# Patient Record
Sex: MALE | Race: WHITE | NOT HISPANIC OR LATINO | Employment: OTHER | URBAN - METROPOLITAN AREA
[De-identification: names, ages, dates, MRNs, and addresses within clinical notes are randomized per-mention and may not be internally consistent; named-entity substitution may affect disease eponyms.]

---

## 2023-04-24 ENCOUNTER — LAB REQUISITION (OUTPATIENT)
Dept: LAB | Facility: HOSPITAL | Age: 68
End: 2023-04-24
Payer: MEDICARE

## 2023-04-24 DIAGNOSIS — L97.512 NON-PRESSURE CHRONIC ULCER OF OTHER PART OF RIGHT FOOT WITH FAT LAYER EXPOSED: ICD-10-CM

## 2023-04-24 DIAGNOSIS — L03.031 CELLULITIS OF RIGHT TOE: ICD-10-CM

## 2023-04-24 PROCEDURE — 87205 SMEAR GRAM STAIN: CPT | Performed by: PODIATRIST

## 2023-04-24 PROCEDURE — 87075 CULTR BACTERIA EXCEPT BLOOD: CPT | Performed by: PODIATRIST

## 2023-04-24 PROCEDURE — 87077 CULTURE AEROBIC IDENTIFY: CPT | Performed by: PODIATRIST

## 2023-04-25 LAB
GRAM STN SPEC: NORMAL
GRAM STN SPEC: NORMAL

## 2023-04-28 LAB
BACTERIA SPEC ANAEROBE CULT: ABNORMAL
BACTERIA SPEC ANAEROBE CULT: ABNORMAL
BACTERIA SPEC CULT: ABNORMAL

## 2023-08-31 ENCOUNTER — LAB REQUISITION (OUTPATIENT)
Dept: LAB | Facility: HOSPITAL | Age: 68
End: 2023-08-31
Payer: MEDICARE

## 2023-08-31 DIAGNOSIS — L97.528 NON-PRESSURE CHRONIC ULCER OF OTHER PART OF LEFT FOOT WITH OTHER SPECIFIED SEVERITY: ICD-10-CM

## 2023-08-31 PROCEDURE — 87075 CULTR BACTERIA EXCEPT BLOOD: CPT | Performed by: PODIATRIST

## 2023-08-31 PROCEDURE — 87070 CULTURE OTHR SPECIMN AEROBIC: CPT | Performed by: PODIATRIST

## 2023-08-31 PROCEDURE — 87205 SMEAR GRAM STAIN: CPT | Performed by: PODIATRIST

## 2023-09-01 LAB — GRAM STN SPEC: NORMAL

## 2023-09-03 LAB
BACTERIA SPEC ANAEROBE CULT: ABNORMAL
BACTERIA WND CULT: NORMAL

## 2023-09-18 RX ORDER — GLUCOSAMINE/CHONDR SU A SOD 167-133 MG
500 CAPSULE ORAL NIGHTLY
COMMUNITY

## 2023-09-18 RX ORDER — ERGOCALCIFEROL 1.25 MG/1
50000 CAPSULE ORAL
COMMUNITY
End: 2023-09-18

## 2023-09-18 RX ORDER — METFORMIN HYDROCHLORIDE 1000 MG/1
1000 TABLET ORAL 2 TIMES DAILY WITH MEALS
COMMUNITY

## 2023-09-18 RX ORDER — MONTELUKAST SODIUM 10 MG/1
10 TABLET ORAL DAILY
COMMUNITY

## 2023-09-18 RX ORDER — INSULIN ASPART 100 [IU]/ML
16 INJECTION, SOLUTION INTRAVENOUS; SUBCUTANEOUS
COMMUNITY

## 2023-09-18 RX ORDER — DULAGLUTIDE 1.5 MG/.5ML
1.5 INJECTION, SOLUTION SUBCUTANEOUS
COMMUNITY

## 2023-09-18 RX ORDER — CHOLECALCIFEROL (VITAMIN D3) 1250 MCG
1 TABLET ORAL WEEKLY
COMMUNITY

## 2023-09-18 RX ORDER — NAPROXEN SODIUM 220 MG/1
81 TABLET, FILM COATED ORAL DAILY
COMMUNITY

## 2023-09-18 RX ORDER — ATORVASTATIN CALCIUM 40 MG/1
40 TABLET, FILM COATED ORAL DAILY
COMMUNITY

## 2023-09-18 RX ORDER — INSULIN GLARGINE 100 [IU]/ML
50 INJECTION, SOLUTION SUBCUTANEOUS NIGHTLY
COMMUNITY

## 2023-09-18 RX ORDER — MELOXICAM 15 MG/1
15 TABLET ORAL DAILY
COMMUNITY

## 2023-09-18 RX ORDER — MULTIVIT WITH IRON,MINERALS
2 TABLET,CHEWABLE ORAL DAILY
COMMUNITY

## 2023-09-18 RX ORDER — AZELASTINE 1 MG/ML
2 SPRAY, METERED NASAL 2 TIMES DAILY
COMMUNITY

## 2023-09-18 RX ORDER — METOPROLOL SUCCINATE 100 MG/1
100 TABLET, EXTENDED RELEASE ORAL DAILY
COMMUNITY

## 2023-09-18 RX ORDER — PRASUGREL 10 MG/1
10 TABLET, FILM COATED ORAL DAILY
COMMUNITY

## 2023-09-18 RX ORDER — LOSARTAN POTASSIUM AND HYDROCHLOROTHIAZIDE 25; 100 MG/1; MG/1
1 TABLET ORAL DAILY
COMMUNITY

## 2023-09-18 RX ORDER — HYDROGEN PEROXIDE 3 %
20 SOLUTION, NON-ORAL MISCELLANEOUS
COMMUNITY

## 2023-09-18 RX ORDER — AMOXICILLIN 500 MG
1 CAPSULE ORAL DAILY
COMMUNITY

## 2023-09-25 NOTE — DISCHARGE INSTRUCTIONS
FOOT SURGERY HOME CARE INSTRUCTIONS     ACTIVITY. Keep your affected foot elevated above the level of your heart over the next 48 hours. Apply an ice bag as often as possible for the next 48 hours. If you received a surgical boot, wear it until your next visit or until instructed otherwise. You may remove the surgical boot when resting and to sleep. Exercise your legs frequently by bending your knees to stimulate circulation and speed healing. You may put partial weight on your affected foot.     DIET. At home, continue with liquids. If you do not have any nausea, you may eat a soft diet. Gradually resume your regular diet. Do not drink alcohol beverages.     CARE OF INCISION. Keep your bandage clean, dry, and intact until your follow up appointment. Do not remove your bandages or inspect the wound. A small amount of blood on the bandage is normal. Limited swelling may occur and your skin may look bruised. Limited swelling and bruising are normal and expected. Do not smoke.     BATHING. Sponge bathe until your follow up appointment. You may take a shower when told by your doctor. No tub baths, swimming, or hot tubs for 2 weeks or when told by your doctor.     MEDICATION. You will receive instructions for your pain and/or antibiotic prescriptions. Take pain medication only if you need it and as directed. Take antibiotics as directed until all the pills are gone. If your medications cause stomach upset, headache, rash, or other abnormal reactions, stop taking them and call your doctor immediately.     WHEN TO CALL THE DOCTOR   Pain, burning, or numbness of the toes not relieved by elevation of the leg   Pale or cold toes, bluish nail beds   Red line going up leg   Excessive swelling   Fever over 100.4 degrees or higher and chills  Pain not relieved by the pain medication   Excessive bloody drainage or bandage become overly stained with bloody fluids   Your cast/bandage gets wet or you bump or injure the surgical site      Patient Education    Debridement Discharge Instructions      What care is needed at home?   Do not remove your dressings/bandages.   Sponge bathe only. You may take a shower when released by your doctor.  3.  Do not lift anything over 10 pounds (4.5 kg).  4. Ask your doctor when you may go back to your normal activities like work, driving, or sex.  5. Be sure to wash your hands before and after touching your wound or dressing.  6. Try not to rub or scratch the healing skin.  7. Do not smoke. It will prevent healing and can increase your risk for infection.  8. Get plenty of rest and follow a healthy diet.    What follow-up care is needed?   Be sure to keep your follow up visit.    Will physical activity be limited?   Physical activity may be limited based on the type of wound. Talk with your doctor about the right amount of activity for you.    What changes to diet are needed?   Ask your doctor if you should eat a special diet. A healthy diet helps wounds to heal better.    What problems could happen?   Bleeding  Infection  Scarring  Poor healing    When do I need to call the doctor?   Signs of infection. These include a fever of 100.4°F (38°C) or higher, chills, or wound that will not heal.  Signs of wound infection. These include swelling, redness, warmth around the wound; too much pain when touched; yellowish, greenish, or bloody discharge; foul smell coming from the cut site; cut site opens up.  Signs of poor healing. This could appear as chalky white, blue, or black appearance to tissue around the wound.  Drugs for pain are not working for you  Too much itching at wound site  A rash at the wound site

## 2023-09-28 ENCOUNTER — PATIENT MESSAGE (OUTPATIENT)
Dept: ADMINISTRATIVE | Facility: OTHER | Age: 68
End: 2023-09-28
Payer: MEDICARE

## 2023-09-29 ENCOUNTER — ANESTHESIA (OUTPATIENT)
Dept: SURGERY | Facility: HOSPITAL | Age: 68
End: 2023-09-29
Payer: MEDICARE

## 2023-09-29 ENCOUNTER — HOSPITAL ENCOUNTER (OUTPATIENT)
Facility: HOSPITAL | Age: 68
Discharge: HOME | End: 2023-09-29
Attending: PODIATRIST | Admitting: PODIATRIST
Payer: MEDICARE

## 2023-09-29 ENCOUNTER — ANESTHESIA EVENT (OUTPATIENT)
Dept: SURGERY | Facility: HOSPITAL | Age: 68
End: 2023-09-29
Payer: MEDICARE

## 2023-09-29 DIAGNOSIS — L97.512 RIGHT FOOT ULCER, WITH FAT LAYER EXPOSED: ICD-10-CM

## 2023-09-29 DIAGNOSIS — E10.42 DIABETIC POLYNEUROPATHY ASSOCIATED WITH TYPE 1 DIABETES MELLITUS: ICD-10-CM

## 2023-09-29 DIAGNOSIS — M86.9 DIABETIC FOOT ULCER WITH OSTEOMYELITIS: ICD-10-CM

## 2023-09-29 DIAGNOSIS — L97.522 ULCER OF LEFT FOOT, WITH FAT LAYER EXPOSED: ICD-10-CM

## 2023-09-29 DIAGNOSIS — M25.775 OSTEOPHYTE OF LEFT FOOT: ICD-10-CM

## 2023-09-29 DIAGNOSIS — L97.509 DIABETIC FOOT ULCER WITH OSTEOMYELITIS: ICD-10-CM

## 2023-09-29 DIAGNOSIS — E11.69 DIABETIC FOOT ULCER WITH OSTEOMYELITIS: ICD-10-CM

## 2023-09-29 DIAGNOSIS — M71.072 ABSCESS OF BURSA OF LEFT FOOT: Primary | ICD-10-CM

## 2023-09-29 DIAGNOSIS — E11.621 DIABETIC FOOT ULCER WITH OSTEOMYELITIS: ICD-10-CM

## 2023-09-29 LAB
POCT GLUCOSE: 107 MG/DL (ref 70–110)
POCT GLUCOSE: 152 MG/DL (ref 70–110)

## 2023-09-29 PROCEDURE — 36000707: Performed by: PODIATRIST

## 2023-09-29 PROCEDURE — 36000706: Performed by: PODIATRIST

## 2023-09-29 PROCEDURE — 63600175 PHARM REV CODE 636 W HCPCS: Performed by: NURSE ANESTHETIST, CERTIFIED REGISTERED

## 2023-09-29 PROCEDURE — 87070 CULTURE OTHR SPECIMN AEROBIC: CPT | Performed by: PODIATRIST

## 2023-09-29 PROCEDURE — 63600175 PHARM REV CODE 636 W HCPCS: Performed by: PODIATRIST

## 2023-09-29 PROCEDURE — 87075 CULTR BACTERIA EXCEPT BLOOD: CPT | Performed by: PODIATRIST

## 2023-09-29 PROCEDURE — D9220A PRA ANESTHESIA: ICD-10-PCS | Mod: ,,, | Performed by: NURSE ANESTHETIST, CERTIFIED REGISTERED

## 2023-09-29 PROCEDURE — 37000009 HC ANESTHESIA EA ADD 15 MINS: Performed by: PODIATRIST

## 2023-09-29 PROCEDURE — 82962 GLUCOSE BLOOD TEST: CPT | Performed by: PODIATRIST

## 2023-09-29 PROCEDURE — 71000015 HC POSTOP RECOV 1ST HR: Performed by: PODIATRIST

## 2023-09-29 PROCEDURE — 37000008 HC ANESTHESIA 1ST 15 MINUTES: Performed by: PODIATRIST

## 2023-09-29 PROCEDURE — 71000016 HC POSTOP RECOV ADDL HR: Performed by: PODIATRIST

## 2023-09-29 PROCEDURE — 25000003 PHARM REV CODE 250: Performed by: ANESTHESIOLOGY

## 2023-09-29 PROCEDURE — D9220A PRA ANESTHESIA: Mod: ,,, | Performed by: NURSE ANESTHETIST, CERTIFIED REGISTERED

## 2023-09-29 PROCEDURE — 87205 SMEAR GRAM STAIN: CPT | Performed by: PODIATRIST

## 2023-09-29 PROCEDURE — 25000003 PHARM REV CODE 250: Performed by: NURSE ANESTHETIST, CERTIFIED REGISTERED

## 2023-09-29 PROCEDURE — 25000003 PHARM REV CODE 250: Performed by: PODIATRIST

## 2023-09-29 PROCEDURE — 63600175 PHARM REV CODE 636 W HCPCS: Performed by: ANESTHESIOLOGY

## 2023-09-29 DEVICE — ALLOGRAFT THERASKIN SMALL: Type: IMPLANTABLE DEVICE | Site: FOOT | Status: FUNCTIONAL

## 2023-09-29 RX ORDER — ONDANSETRON 2 MG/ML
4 INJECTION INTRAMUSCULAR; INTRAVENOUS ONCE
Status: DISCONTINUED | OUTPATIENT
Start: 2023-09-29 | End: 2023-09-29 | Stop reason: HOSPADM

## 2023-09-29 RX ORDER — LIDOCAINE HYDROCHLORIDE 10 MG/ML
INJECTION INFILTRATION; PERINEURAL
Status: DISCONTINUED | OUTPATIENT
Start: 2023-09-29 | End: 2023-09-29 | Stop reason: HOSPADM

## 2023-09-29 RX ORDER — SODIUM CHLORIDE 9 MG/ML
INJECTION, SOLUTION INTRAVENOUS CONTINUOUS
Status: DISCONTINUED | OUTPATIENT
Start: 2023-09-29 | End: 2023-09-29 | Stop reason: HOSPADM

## 2023-09-29 RX ORDER — OXYCODONE HYDROCHLORIDE 5 MG/1
10 TABLET ORAL EVERY 4 HOURS PRN
Status: DISCONTINUED | OUTPATIENT
Start: 2023-09-29 | End: 2023-09-29 | Stop reason: HOSPADM

## 2023-09-29 RX ORDER — MIDAZOLAM HYDROCHLORIDE 1 MG/ML
2 INJECTION INTRAMUSCULAR; INTRAVENOUS ONCE AS NEEDED
Status: COMPLETED | OUTPATIENT
Start: 2023-09-29 | End: 2023-09-29

## 2023-09-29 RX ORDER — LIDOCAINE HYDROCHLORIDE 10 MG/ML
1 INJECTION, SOLUTION EPIDURAL; INFILTRATION; INTRACAUDAL; PERINEURAL ONCE
Status: DISCONTINUED | OUTPATIENT
Start: 2023-09-29 | End: 2023-09-29 | Stop reason: HOSPADM

## 2023-09-29 RX ORDER — METOCLOPRAMIDE HYDROCHLORIDE 5 MG/ML
10 INJECTION INTRAMUSCULAR; INTRAVENOUS ONCE
Status: COMPLETED | OUTPATIENT
Start: 2023-09-29 | End: 2023-09-29

## 2023-09-29 RX ORDER — LIDOCAINE HYDROCHLORIDE 10 MG/ML
INJECTION, SOLUTION EPIDURAL; INFILTRATION; INTRACAUDAL; PERINEURAL
Status: DISCONTINUED | OUTPATIENT
Start: 2023-09-29 | End: 2023-09-29

## 2023-09-29 RX ORDER — IPRATROPIUM BROMIDE AND ALBUTEROL SULFATE 2.5; .5 MG/3ML; MG/3ML
3 SOLUTION RESPIRATORY (INHALATION) ONCE AS NEEDED
Status: DISCONTINUED | OUTPATIENT
Start: 2023-09-29 | End: 2023-09-29 | Stop reason: HOSPADM

## 2023-09-29 RX ORDER — HYDROMORPHONE HYDROCHLORIDE 2 MG/ML
0.4 INJECTION, SOLUTION INTRAMUSCULAR; INTRAVENOUS; SUBCUTANEOUS EVERY 5 MIN PRN
Status: DISCONTINUED | OUTPATIENT
Start: 2023-09-29 | End: 2023-09-29 | Stop reason: HOSPADM

## 2023-09-29 RX ORDER — VANCOMYCIN HYDROCHLORIDE 500 MG/10ML
INJECTION, POWDER, LYOPHILIZED, FOR SOLUTION INTRAVENOUS
Status: DISCONTINUED
Start: 2023-09-29 | End: 2023-09-29 | Stop reason: HOSPADM

## 2023-09-29 RX ORDER — DIPHENHYDRAMINE HYDROCHLORIDE 50 MG/ML
25 INJECTION INTRAMUSCULAR; INTRAVENOUS EVERY 6 HOURS PRN
Status: DISCONTINUED | OUTPATIENT
Start: 2023-09-29 | End: 2023-09-29 | Stop reason: HOSPADM

## 2023-09-29 RX ORDER — BUPIVACAINE HYDROCHLORIDE 5 MG/ML
INJECTION, SOLUTION EPIDURAL; INTRACAUDAL
Status: DISCONTINUED
Start: 2023-09-29 | End: 2023-09-29 | Stop reason: HOSPADM

## 2023-09-29 RX ORDER — METOCLOPRAMIDE HYDROCHLORIDE 5 MG/ML
10 INJECTION INTRAMUSCULAR; INTRAVENOUS EVERY 10 MIN PRN
Status: DISCONTINUED | OUTPATIENT
Start: 2023-09-29 | End: 2023-09-29 | Stop reason: HOSPADM

## 2023-09-29 RX ORDER — ONDANSETRON 4 MG/1
8 TABLET, ORALLY DISINTEGRATING ORAL EVERY 8 HOURS PRN
Status: DISCONTINUED | OUTPATIENT
Start: 2023-09-29 | End: 2023-09-29 | Stop reason: HOSPADM

## 2023-09-29 RX ORDER — PROPOFOL 10 MG/ML
VIAL (ML) INTRAVENOUS
Status: DISCONTINUED | OUTPATIENT
Start: 2023-09-29 | End: 2023-09-29

## 2023-09-29 RX ORDER — MORPHINE SULFATE 4 MG/ML
2 INJECTION, SOLUTION INTRAMUSCULAR; INTRAVENOUS
Status: DISCONTINUED | OUTPATIENT
Start: 2023-09-29 | End: 2023-09-29 | Stop reason: HOSPADM

## 2023-09-29 RX ORDER — MEPERIDINE HYDROCHLORIDE 25 MG/ML
12.5 INJECTION INTRAMUSCULAR; INTRAVENOUS; SUBCUTANEOUS ONCE
Status: DISCONTINUED | OUTPATIENT
Start: 2023-09-29 | End: 2023-09-29 | Stop reason: HOSPADM

## 2023-09-29 RX ORDER — ACETAMINOPHEN 500 MG
1000 TABLET ORAL ONCE
Status: COMPLETED | OUTPATIENT
Start: 2023-09-29 | End: 2023-09-29

## 2023-09-29 RX ORDER — AMOXICILLIN 875 MG/1
875 TABLET, FILM COATED ORAL EVERY 12 HOURS
COMMUNITY

## 2023-09-29 RX ORDER — VANCOMYCIN HYDROCHLORIDE 500 MG/10ML
INJECTION, POWDER, LYOPHILIZED, FOR SOLUTION INTRAVENOUS
Status: DISCONTINUED | OUTPATIENT
Start: 2023-09-29 | End: 2023-09-29 | Stop reason: HOSPADM

## 2023-09-29 RX ORDER — FAMOTIDINE 10 MG/ML
20 INJECTION INTRAVENOUS ONCE
Status: COMPLETED | OUTPATIENT
Start: 2023-09-29 | End: 2023-09-29

## 2023-09-29 RX ORDER — PROPOFOL 10 MG/ML
VIAL (ML) INTRAVENOUS CONTINUOUS PRN
Status: DISCONTINUED | OUTPATIENT
Start: 2023-09-29 | End: 2023-09-29

## 2023-09-29 RX ORDER — LIDOCAINE HYDROCHLORIDE 10 MG/ML
INJECTION INFILTRATION; PERINEURAL
Status: DISCONTINUED
Start: 2023-09-29 | End: 2023-09-29 | Stop reason: HOSPADM

## 2023-09-29 RX ORDER — BUPIVACAINE HYDROCHLORIDE 5 MG/ML
INJECTION, SOLUTION EPIDURAL; INTRACAUDAL
Status: DISCONTINUED | OUTPATIENT
Start: 2023-09-29 | End: 2023-09-29 | Stop reason: HOSPADM

## 2023-09-29 RX ORDER — CEFAZOLIN SODIUM 1 G/3ML
1 INJECTION, POWDER, FOR SOLUTION INTRAMUSCULAR; INTRAVENOUS
Status: DISCONTINUED | OUTPATIENT
Start: 2023-09-29 | End: 2023-09-29 | Stop reason: HOSPADM

## 2023-09-29 RX ORDER — ONDANSETRON 2 MG/ML
4 INJECTION INTRAMUSCULAR; INTRAVENOUS EVERY 12 HOURS PRN
Status: DISCONTINUED | OUTPATIENT
Start: 2023-09-29 | End: 2023-09-29 | Stop reason: HOSPADM

## 2023-09-29 RX ORDER — METHOCARBAMOL 100 MG/ML
1000 INJECTION, SOLUTION INTRAMUSCULAR; INTRAVENOUS ONCE AS NEEDED
Status: DISCONTINUED | OUTPATIENT
Start: 2023-09-29 | End: 2023-09-29

## 2023-09-29 RX ORDER — HYDROCODONE BITARTRATE AND ACETAMINOPHEN 5; 325 MG/1; MG/1
1 TABLET ORAL
Status: DISCONTINUED | OUTPATIENT
Start: 2023-09-29 | End: 2023-09-29 | Stop reason: HOSPADM

## 2023-09-29 RX ADMIN — METOCLOPRAMIDE HYDROCHLORIDE 10 MG: 5 INJECTION INTRAMUSCULAR; INTRAVENOUS at 10:09

## 2023-09-29 RX ADMIN — PROPOFOL 35 MCG/KG/MIN: 10 INJECTION, EMULSION INTRAVENOUS at 11:09

## 2023-09-29 RX ADMIN — FAMOTIDINE 20 MG: 10 INJECTION, SOLUTION INTRAVENOUS at 10:09

## 2023-09-29 RX ADMIN — LIDOCAINE HYDROCHLORIDE 50 MG: 10 INJECTION, SOLUTION EPIDURAL; INFILTRATION; INTRACAUDAL; PERINEURAL at 11:09

## 2023-09-29 RX ADMIN — SODIUM CHLORIDE, POTASSIUM CHLORIDE, SODIUM LACTATE AND CALCIUM CHLORIDE: 600; 310; 30; 20 INJECTION, SOLUTION INTRAVENOUS at 11:09

## 2023-09-29 RX ADMIN — PROPOFOL 70 MG: 10 INJECTION, EMULSION INTRAVENOUS at 11:09

## 2023-09-29 RX ADMIN — ACETAMINOPHEN 1000 MG: 500 TABLET ORAL at 10:09

## 2023-09-29 RX ADMIN — MIDAZOLAM HYDROCHLORIDE 2 MG: 1 INJECTION, SOLUTION INTRAMUSCULAR; INTRAVENOUS at 10:09

## 2023-09-29 NOTE — PROGRESS NOTES
[facility] Surgical - Periop Services  Podiatry  Progress Note    Patient Name: Jesse Moser  MRN: [id number]  Admission Date: 9/29/2023  Hospital Length of Stay: 0 days  Attending Physician: Issa Zambrano DPM  Primary Care Provider: Richardson Mims MD     Subjective:     Interval History:  Patient doing well following bilateral foot surgery for chronic diabetic foot ulcers.  No new concerns.    Follow-up For: Procedure(s) (LRB):  OSTECTOMY, METATARSAL BONE, HEAD Partial excision of 5th metatarsal Left (Left)  INCISION AND DRAINAGE, LOWER EXTREMITY   Left  including bone (Left)  DEBRIDEMENT, FOOT Right (Right)  APPLICATION, GRAFT Right  reparation / application skin substitue (Right)    Post-Operative Day: Day of Surgery    Scheduled Meds:   BUPivacaine (PF) 0.5% (5 mg/mL)        ceFAZolin  1 g Intravenous Once Pre-Op    LIDOcaine (PF) 10 mg/ml (1%)  1 mL Intradermal Once    LIDOcaine HCL 10 mg/ml (1%)        meperidine  12.5 mg Intravenous Once    ondansetron  4 mg Intravenous Once    vancomycin         Continuous Infusions:   sodium chloride 0.9%       PRN Meds:albuterol-ipratropium, BUPivacaine (PF) 0.5% (5 mg/mL), diphenhydrAMINE, HYDROcodone-acetaminophen, HYDROmorphone, LIDOcaine HCL 10 mg/ml (1%), methocarbamoL, metoclopramide HCl, morphine, ondansetron, ondansetron, oxyCODONE, vancomycin    Review of Systems  Objective:     Vital Signs (Most Recent):  Temp: 97.8 °F (36.6 °C) (09/29/23 0940)  Pulse: 68 (09/29/23 1214)  BP: 129/81 (09/29/23 1214)  SpO2: 99 % (09/29/23 1214) Vital Signs (24h Range):  Temp:  [97.8 °F (36.6 °C)] 97.8 °F (36.6 °C)  Pulse:  [68-73] 68  SpO2:  [96 %-99 %] 99 %  BP: (129-152)/(81-85) 129/81     Weight: (!) 144.9 kg (319 lb 7.1 oz)  Body mass index is 43.32 kg/m².    Foot Exam      Assessment/Plan:     Active Diagnoses:    Diagnosis Date Noted POA    Osteophyte of left foot [M25.775] 09/29/2023 Yes    Right foot ulcer, with fat layer exposed [L97.512] 09/29/2023 Yes     Diabetic foot ulcer with osteomyelitis [E11.621, E11.69, L97.509, M86.9] 09/29/2023 Yes    Abscess of bursa of left foot [M71.072] 09/29/2023 Yes      Problems Resolved During this Admission:     Plan to discharge home.  Keep dressing dry clean intact.      Elevate     Minimal weight-bearing with a walker, Darco shoes for offloading.      Continue antibiotics as prescribed.      Will follow culture results from left foot.      New call with any concerns for systemic signs of infection or questions.      Office Wednesday as scheduled.    Issa Zambrano DPM  Podiatry  [facility] Surgical - Periop Services

## 2023-09-29 NOTE — TRANSFER OF CARE
Anesthesia Transfer of Care Note    Patient: Jesse Moser    Procedure(s) Performed: Procedure(s) (LRB):  OSTECTOMY, METATARSAL BONE, HEAD Partial excision of 5th metatarsal Left (Left)  INCISION AND DRAINAGE, LOWER EXTREMITY   Left  including bone (Left)  DEBRIDEMENT, FOOT Right (Right)  APPLICATION, GRAFT Right  reparation / application skin substitue (Right)    Patient location: OPS    Anesthesia Type: MAC    Transport from OR: Transported from OR on room air with adequate spontaneous ventilation    Post pain: adequate analgesia    Post assessment: no apparent anesthetic complications    Post vital signs: stable    Level of consciousness: awake, alert and oriented    Complications: none    Transfer of care protocol was followed      Last vitals:   Visit Vitals  BP (!) 152/85   Pulse 73   Temp 36.6 °C (97.8 °F) (Oral)   Ht 6' (1.829 m)   Wt (!) 144.9 kg (319 lb 7.1 oz)   SpO2 96%   BMI 43.32 kg/m²

## 2023-09-29 NOTE — DISCHARGE SUMMARY
[facility] Surgical - Periop Services  Podiatry  Discharge Summary      Patient Name: Jesse Moser  MRN: [id number]  Admission Date: 9/29/2023  Hospital Length of Stay: 0 days  Discharge Date and Time:  09/29/2023 12:26 PM  Attending Physician: Issa Zambrano DPM   Discharging Provider: Issa Zambrano DPM  Primary Care Provider: Richardson Mims MD      Procedure(s) (LRB):  OSTECTOMY, METATARSAL BONE, HEAD Partial excision of 5th metatarsal Left (Left)  INCISION AND DRAINAGE, LOWER EXTREMITY   Left  including bone (Left)  DEBRIDEMENT, FOOT Right (Right)  APPLICATION, GRAFT Right  reparation / application skin substitue (Right)      Hospital Course:  Patient admitted for same-day surgery bilateral feet forward chronic diabetic foot ulcers.  Procedures well tolerated.  He will be discharged home as scheduled.          Pending Diagnostic Studies:       None          Final Active Diagnoses:    Diagnosis Date Noted POA    Osteophyte of left foot [M25.775] 09/29/2023 Yes    Right foot ulcer, with fat layer exposed [L97.512] 09/29/2023 Yes    Diabetic foot ulcer with osteomyelitis [E11.621, E11.69, L97.509, M86.9] 09/29/2023 Yes    Abscess of bursa of left foot [M71.072] 09/29/2023 Yes      Problems Resolved During this Admission:      Discharged Condition: good    Disposition: Home or Self Care    Follow Up:   Follow-up Information       Issa Zambrano DPM Follow up.    Specialty: Podiatry  Contact information:  [facility] [address]  [phone]                           Patient Instructions:      Diet general     Keep surgical extremity elevated     Ice to affected area   Order Comments: using barrier between ice and skin (specify duration&frequency)     No driving, operating heavy equipment or signing legal documents while taking pain medication     Leave dressing on - Keep it clean, dry, and intact until clinic visit     Call MD for:  temperature >100.4     Call MD for:  persistent  nausea and vomiting     Call MD for:  severe uncontrolled pain     Call MD for:  difficulty breathing, headache or visual disturbances     Call MD for:  redness, tenderness, or signs of infection (pain, swelling, redness, odor or green/yellow discharge around incision site)     Call MD for:  hives     Call MD for:  persistent dizziness or light-headedness     Call MD for:  extreme fatigue     Non weight bearing   Order Comments: Minimal Partial Weight bearing for forefoot surgery. Nonweightbearing for hindfoot/ankle surgery if in a splint.     Medications:  Reconciled Home Medications:      Medication List        CONTINUE taking these medications      amoxicillin 875 MG tablet  Commonly known as: AMOXIL  Take 875 mg by mouth every 12 (twelve) hours.     aspirin 81 MG Chew  Take 81 mg by mouth once daily.     atorvastatin 40 MG tablet  Commonly known as: LIPITOR  Take 40 mg by mouth once daily.     azelastine 137 mcg (0.1 %) nasal spray  Commonly known as: ASTELIN  2 sprays by Nasal route 2 (two) times daily.     CENTRUM SILVER MEN ORAL  Take 1 tablet by mouth Daily.     cholecalciferol (vitamin D3) 1,250 mcg (50,000 unit) Tab  Take 1 tablet by mouth once a week.     esomeprazole 20 MG capsule  Commonly known as: NEXIUM  Take 20 mg by mouth before breakfast.     fish oil-omega-3 fatty acids 300-1,000 mg capsule  Take 1 capsule by mouth once daily. 1200 mg     HYZAAR 100-25 mg per tablet  Generic drug: losartan-hydrochlorothiazide 100-25 mg  Take 1 tablet by mouth once daily.     insulin aspart U-100 100 unit/mL injection  Commonly known as: NovoLOG  Inject 16 Units into the skin 3 (three) times daily before meals.     JARDIANCE 25 mg tablet  Generic drug: empagliflozin  Take 25 mg by mouth once daily.     LANTUS SOLOSTAR U-100 INSULIN glargine 100 units/mL SubQ pen  Generic drug: insulin  Inject 50 Units into the skin every evening.     meloxicam 15 MG tablet  Commonly known as: MOBIC  Take 15 mg by mouth once  daily.     metFORMIN 1000 MG tablet  Commonly known as: GLUCOPHAGE  Take 1,000 mg by mouth 2 (two) times daily with meals.     metoprolol succinate 100 MG 24 hr tablet  Commonly known as: TOPROL-XL  Take 100 mg by mouth once daily.     montelukast 10 mg tablet  Commonly known as: SINGULAIR  Take 10 mg by mouth once daily.     niacin 500 MG Tab  Take 500 mg by mouth every evening.     NON FORMULARY MEDICATION  Take 1 tablet by mouth Daily. Nervive     OSTEO BI-FLEX 250-200 mg Tab  Generic drug: glucosamine-chondroitin  Take 2 tablets by mouth Daily.     POTASSIUM ORAL  Take 99 mg by mouth Daily.     prasugreL 10 mg Tab  Commonly known as: EFFIENT  Take 10 mg by mouth once daily.     TRULICITY 1.5 mg/0.5 mL pen injector  Generic drug: dulaglutide  Inject 1.5 mg into the skin every 7 days. On Wednesday            Time spent on the discharge of patient: 40 minutes    Issa Zambrano DPM  Podiatry  [facility] Surgical - Periop Services

## 2023-09-29 NOTE — ANESTHESIA PREPROCEDURE EVALUATION
09/29/2023  Jesse Moser is a 67 y.o., male presenting for left foot surgery.    Other Medical History   CAD (coronary artery disease) PAD (peripheral artery disease)   Diabetes HTN (hypertension)   HLD (hyperlipidemia) Obesity, unspecified   Ulcer of lower limb      Surgical History    HERNIA REPAIR APPENDECTOMY   TONSILLECTOMY AND ADENOIDECTOMY POLYPECTOMY   KIDNEY STONE SURGERY FINGER FRACTURE SURGERY   CHOLECYSTECTOMY TYMPANOPLASTY   MYRINGOTOMY W/ TUBES NASAL SEPTUM SURGERY   TENDON REPAIR VEIN SURGERY   VEIN SURGERY Leg Stent   SURGICAL REMOVAL OF PILONIDAL CYST Leg Stent   SKIN GRAFT TOE AMPUTATION   CORONARY ANGIOPLASTY        Pre-op Assessment    I have reviewed the Patient Summary Reports.     I have reviewed the Nursing Notes. I have reviewed the NPO Status.   I have reviewed the Medications.     Review of Systems  Anesthesia Hx:  No problems with previous Anesthesia    Social:  Former Smoker    Cardiovascular:   Hypertension CAD      Endocrine:   Diabetes  Morbid Obesity / BMI > 40      Physical Exam  General: Well nourished, Cooperative, Alert and Oriented    Airway:  Mallampati: IV   Mouth Opening: Normal  TM Distance: Normal  Tongue: Normal  Neck ROM: Normal ROM    Dental:  Intact    Chest/Lungs:  Clear to auscultation, Normal Respiratory Rate    Heart:  Rate: Normal  Rhythm: Regular Rhythm  Sounds: Normal    Abdomen:  Normal, Soft, Nontender        Anesthesia Plan  Type of Anesthesia, risks & benefits discussed:    Anesthesia Type: MAC  Intra-op Monitoring Plan: Standard ASA Monitors  Post Op Pain Control Plan: multimodal analgesia  Induction:  IV  Airway Plan: Direct  Informed Consent: Informed consent signed with the Patient and all parties understand the risks and agree with anesthesia plan.  All questions answered.   ASA Score: 3  Day of Surgery Review of History & Physical: H&P  Update referred to the surgeon/provider.    Ready For Surgery From Anesthesia Perspective.     .

## 2023-09-29 NOTE — ANESTHESIA POSTPROCEDURE EVALUATION
Anesthesia Post Evaluation    Patient: Jesse Moser    Procedure(s) Performed: Procedure(s) (LRB):  OSTECTOMY, METATARSAL BONE, HEAD Partial excision of 5th metatarsal Left (Left)  INCISION AND DRAINAGE, LOWER EXTREMITY   Left  including bone (Left)  DEBRIDEMENT, FOOT Right (Right)  APPLICATION, GRAFT Right  reparation / application skin substitue (Right)    Final Anesthesia Type: MAC      Patient location during evaluation: OPS  Patient participation: Yes- Able to Participate  Level of consciousness: awake and alert and oriented  Post-procedure vital signs: reviewed and stable  Airway patency: patent    PONV status at discharge: No PONV  Anesthetic complications: no      Cardiovascular status: blood pressure returned to baseline  Respiratory status: unassisted  Hydration status: euvolemic  Follow-up not needed.          Vitals Value Taken Time   /85 09/29/23 0940   Temp 36.6 °C (97.8 °F) 09/29/23 0940   Pulse 73 09/29/23 0940   Resp 16 09/29/23 1209   SpO2 96 % 09/29/23 0940         No case tracking events are documented in the log.      Pain/Carlin Score: Pain Rating Prior to Med Admin: 0 (9/29/2023 10:31 AM)

## 2023-09-29 NOTE — OP NOTE
[facility] Surgical - Periop Services  General Surgery  Operative Note    SUMMARY     Date of Procedure: 9/29/2023     Procedure: Procedure(s) (LRB):  INCISION AND DRAINAGE, LOWER EXTREMITY, Left  including bone (Left)  Wound preparation for skin graft, right.  APPLICATION, GRAFT Right  preparation / application skin substitue (Right)       Surgeon(s) and Role:     * Issa Zambrano DPM - Primary    Assisting Surgeon: None    Pre-Operative Diagnosis: Osteophyte of left foot [M25.775]  Right foot ulcer, with fat layer exposed, right [L97.512]  Diabetic foot ulcer with abscess suspect osteomyelitis, left    Post-Operative Diagnosis: Same    Anesthesia: Local MAC    Description of Technical Procedures:         The patient was brought into the operating room.  Time-out performed.  The lower extremity was prepped and draped in the usual sterile fashion.  Ankle block was performed with 0.5% Marcaine plain.     First  Procedure was incision and drainage, left foot, including bone, deep to fascia.  Wound with local erythema edema identified at the 5th metatarsal head.  Wound probed centrally to the joint capsule.  Majority of wound with subcutaneous necrosis.  Incision performed at the left lateral forefoot excising the majority of the wound using 3-1 elliptical incisional technique.  Wound was taken down full-thickness flaps dorsally and plantarly to the 5th metatarsophalangeal joint.  Exploration for infection and tissue viability, incision and drainage, washout, resection of necrotic and nonviable tissue was performed.   Vascularity to the tissue appeared reasonably healthy.  The wound had initially probed to the metatarsophalangeal joint but not quite to bone.  There were osteophytes noted laterally at the bone likely keeping the wound open as a bony prominence.  No purulent drainage at the bone.  Sagittal saw was used to remove the metatarsal head.  Deep cultures including bone and deep fascia subcutaneous  tissues were obtained.  Hemostasis achieved electrocautery.  Vancomycin powder applied to the wound bed.  the wound was closed partially with nylon.  The remainder of the wound was closed and covered with skin substitute, TheraSkin.      Next procedure was right foot wound preparation and application of skin substitute.  Wound at the lateral left plantar midfoot was inspected.  Wound was full-thickness with subcutaneous base.  No signs of infection.  Wound measured 20 by 15 mm in diameter.  Wound bed was prepped to remove all nonviable and potentially infected of biofilm with combination of sharp dissection with 15 blade and curette getting down to healthy bleeding granular tissue.  Hemostasis was achieved until it dried compression.  The skin substitute, TheraSkin was then applied to the wound bed and intact with Dermabond to the surrounding skin.  The graft was covered with Adaptic, compression ulcer type dressing.    Compression dressings were applied to both feet with Kerlix, ABDs, Coban.  Patient tolerated procedure and anesthesia well vital signs stable throughout.  He was transferred to the PACU where he recovered well and will be discharged home with all postoperative instructions and prescriptions.    Significant Surgical Tasks Conducted by the Assistant(s), if Applicable: none    Estimated Blood Loss (EBL): * No values recorded between 9/29/2023 11:27 AM and 9/29/2023 12:10 PM *           Implants:   Implant Name Type Inv. Item Serial No.  Lot No. LRB No. Used Action   ALLOGRAFT THERASKIN SMALL - [id number]  [employer]OG[employer]FT [employer]ERASKIN SMALL  [email or web address] [id number] Bilateral 1 Implanted       Specimens:   Specimen (24h ago, onward)      None                    Condition: Good    Disposition: PACU - hemodynamically stable.    Attestation: I performed the procedure.

## 2023-09-30 VITALS
WEIGHT: 315 LBS | OXYGEN SATURATION: 99 % | DIASTOLIC BLOOD PRESSURE: 95 MMHG | SYSTOLIC BLOOD PRESSURE: 166 MMHG | HEART RATE: 62 BPM | TEMPERATURE: 98 F | HEIGHT: 72 IN | BODY MASS INDEX: 42.66 KG/M2

## 2023-09-30 LAB
GRAM STN SPEC: NORMAL
GRAM STN SPEC: NORMAL

## 2023-10-03 LAB — BACTERIA SPEC ANAEROBE CULT: ABNORMAL

## 2023-10-04 LAB — BACTERIA TISS AEROBE CULT: NORMAL

## 2023-10-31 ENCOUNTER — LAB REQUISITION (OUTPATIENT)
Dept: LAB | Facility: HOSPITAL | Age: 68
End: 2023-10-31
Payer: MEDICARE

## 2023-10-31 DIAGNOSIS — L03.116 CELLULITIS OF LEFT LOWER LIMB: ICD-10-CM

## 2023-10-31 PROCEDURE — 87075 CULTR BACTERIA EXCEPT BLOOD: CPT | Performed by: PODIATRIST

## 2023-10-31 PROCEDURE — 87205 SMEAR GRAM STAIN: CPT | Performed by: PODIATRIST

## 2023-10-31 PROCEDURE — 87070 CULTURE OTHR SPECIMN AEROBIC: CPT | Performed by: PODIATRIST

## 2023-11-01 LAB
GRAM STN SPEC: NORMAL
GRAM STN SPEC: NORMAL

## 2023-11-03 LAB
BACTERIA SPEC ANAEROBE CULT: NORMAL
BACTERIA WND CULT: ABNORMAL

## 2024-01-25 ENCOUNTER — LAB REQUISITION (OUTPATIENT)
Dept: LAB | Facility: HOSPITAL | Age: 69
End: 2024-01-25
Payer: MEDICARE

## 2024-01-25 DIAGNOSIS — L03.031 CELLULITIS OF RIGHT TOE: ICD-10-CM

## 2024-01-25 PROCEDURE — 87070 CULTURE OTHR SPECIMN AEROBIC: CPT | Performed by: PODIATRIST

## 2024-01-25 PROCEDURE — 87075 CULTR BACTERIA EXCEPT BLOOD: CPT | Performed by: PODIATRIST

## 2024-01-25 PROCEDURE — 87205 SMEAR GRAM STAIN: CPT | Performed by: PODIATRIST

## 2024-01-26 LAB
GRAM STN SPEC: NORMAL
GRAM STN SPEC: NORMAL

## 2024-01-27 LAB — BACTERIA WND CULT: ABNORMAL

## 2024-01-29 LAB — BACTERIA SPEC ANAEROBE CULT: ABNORMAL

## 2024-02-27 ENCOUNTER — LAB REQUISITION (OUTPATIENT)
Dept: LAB | Facility: HOSPITAL | Age: 69
End: 2024-02-27
Payer: MEDICARE

## 2024-02-27 DIAGNOSIS — L03.031 CELLULITIS OF RIGHT TOE: ICD-10-CM

## 2024-02-27 PROCEDURE — 87075 CULTR BACTERIA EXCEPT BLOOD: CPT | Performed by: PODIATRIST

## 2024-02-27 PROCEDURE — 87077 CULTURE AEROBIC IDENTIFY: CPT | Performed by: PODIATRIST

## 2024-02-27 PROCEDURE — 87205 SMEAR GRAM STAIN: CPT | Performed by: PODIATRIST

## 2024-02-28 LAB — GRAM STN SPEC: NORMAL

## 2024-02-29 LAB — BACTERIA WND CULT: ABNORMAL

## 2024-03-02 LAB — BACTERIA SPEC ANAEROBE CULT: ABNORMAL

## (undated) DEVICE — SUPPORT ULNA NERVE PROTECTOR

## (undated) DEVICE — ADHESIVE DERMABOND ADVANCED

## (undated) DEVICE — TRAY SKIN SCRUB WET PREMIUM

## (undated) DEVICE — SOL NACL IRR 1000ML BTL

## (undated) DEVICE — MATTRESS HOVERMAT TRNSF 39X78

## (undated) DEVICE — DRESSING XEROFORM GAUZE 5X9

## (undated) DEVICE — BOWL UTILITY BLUE 32OZ

## (undated) DEVICE — DRAPE U-DRAPE ADHESIVE 60X60IN

## (undated) DEVICE — SOL NORMAL USPCA 0.9%

## (undated) DEVICE — DRAPE EXTREMITY ORTHOMAX

## (undated) DEVICE — DRESSING GAUZE XEROFORM 5X9

## (undated) DEVICE — PAD PREP CUFFED NS 24X48IN

## (undated) DEVICE — BNDG COFLEX FOAM LF2 ST 3X5YD

## (undated) DEVICE — DRAPE MEDIUM SHEET 40X70IN

## (undated) DEVICE — GLOVE PROTEXIS LTX MICRO  7.5

## (undated) DEVICE — GAUZE SPONGE 4X4 12PLY

## (undated) DEVICE — STAPLER SKIN PROXIMATE WIDE

## (undated) DEVICE — Device

## (undated) DEVICE — SUT ETHILON 4-0 PS4 18 BLK

## (undated) DEVICE — STOCKINET 4INX48

## (undated) DEVICE — PAD ABD 8X10 STERILE

## (undated) DEVICE — BLADE SURG STAINLESS STEEL #15

## (undated) DEVICE — SUT ETHILON 2-0 FS 18IN BLK

## (undated) DEVICE — COVER PROXIMA MAYO STAND

## (undated) DEVICE — BLADES MINI BEAVER 62

## (undated) DEVICE — DRESSING N ADH OIL EMUL 3X3

## (undated) DEVICE — CURETTE DERMAL 7MM DISP

## (undated) DEVICE — BANDAGE ESMARK ELASTIC ST 4X9

## (undated) DEVICE — BANDAGE CONFORM STRTCH 1IN

## (undated) DEVICE — SPONGE KERLIX ANTIMIC 6X6.75IN

## (undated) DEVICE — KIT SURGICAL TURNOVER

## (undated) DEVICE — NDL SAFETY 22G X 1.5 ECLIPSE

## (undated) DEVICE — NDL HYPO REG 25G X 1 1/2

## (undated) DEVICE — ADHESIVE MASTISOL VIAL 48/BX

## (undated) DEVICE — ELECTRODE PATIENT RETURN DISP

## (undated) DEVICE — BANDAGE MATRIX HK LOOP 4IN 5YD